# Patient Record
Sex: FEMALE | URBAN - METROPOLITAN AREA
[De-identification: names, ages, dates, MRNs, and addresses within clinical notes are randomized per-mention and may not be internally consistent; named-entity substitution may affect disease eponyms.]

---

## 2023-11-15 ENCOUNTER — LAB OUTSIDE AN ENCOUNTER (OUTPATIENT)
Dept: URBAN - METROPOLITAN AREA CLINIC 94 | Facility: CLINIC | Age: 48
End: 2023-11-15

## 2023-11-15 ENCOUNTER — OFFICE VISIT (OUTPATIENT)
Dept: URBAN - METROPOLITAN AREA CLINIC 94 | Facility: CLINIC | Age: 48
End: 2023-11-15
Payer: COMMERCIAL

## 2023-11-15 ENCOUNTER — DASHBOARD ENCOUNTERS (OUTPATIENT)
Age: 48
End: 2023-11-15

## 2023-11-15 VITALS
OXYGEN SATURATION: 95 % | BODY MASS INDEX: 33.18 KG/M2 | HEIGHT: 69 IN | TEMPERATURE: 97.5 F | SYSTOLIC BLOOD PRESSURE: 120 MMHG | WEIGHT: 224 LBS | DIASTOLIC BLOOD PRESSURE: 77 MMHG | HEART RATE: 87 BPM

## 2023-11-15 DIAGNOSIS — Z12.11 ENCOUNTER FOR SCREENING COLONOSCOPY: ICD-10-CM

## 2023-11-15 PROCEDURE — 99202 OFFICE O/P NEW SF 15 MIN: CPT | Performed by: INTERNAL MEDICINE

## 2023-11-15 PROCEDURE — 99242 OFF/OP CONSLTJ NEW/EST SF 20: CPT | Performed by: INTERNAL MEDICINE

## 2023-11-15 RX ORDER — ESCITALOPRAM OXALATE 20 MG/1
1 TABLET TABLET, FILM COATED ORAL ONCE A DAY
Status: ACTIVE | COMMUNITY

## 2023-11-15 RX ORDER — CHOLECALCIFEROL (VITAMIN D3) 50 MCG
1 TABLET TABLET ORAL ONCE A DAY
Status: ACTIVE | COMMUNITY

## 2023-11-15 RX ORDER — LEVOTHYROXINE SODIUM 125 UG/1
1 TABLET IN THE MORNING ON AN EMPTY STOMACH CAPSULE ORAL ONCE A DAY
Status: ACTIVE | COMMUNITY

## 2023-11-15 RX ORDER — LISINOPRIL 20 MG/1
1 TABLET TABLET ORAL ONCE A DAY
Status: ACTIVE | COMMUNITY

## 2023-11-15 RX ORDER — SODIUM, POTASSIUM,MAG SULFATES 17.5-3.13G
177 ML AS DIRECTED SOLUTION, RECONSTITUTED, ORAL ORAL ONCE
Qty: 177 ML | Refills: 0 | OUTPATIENT
Start: 2023-11-15 | End: 2023-11-16

## 2023-11-15 NOTE — HPI-TODAY'S VISIT:
49 yo F evaluated today for screening colonoscopy. Pt referred to clinic by DR Karlee Blank Copy of records will be faxed to referring MD for review.   Pt denies ever having a colonoscopy. She denies GI sx. Denies abdominal pain, melena or hematochezia. Denies unintentional wt loss or changes in bowel habits. Pt with hx of gastric sleeve 2018. Does have some consipation due to high protein diet   Pt denies family hx of colon cancer.

## 2023-12-04 ENCOUNTER — CLAIMS CREATED FROM THE CLAIM WINDOW (OUTPATIENT)
Dept: URBAN - METROPOLITAN AREA SURGERY CENTER 17 | Facility: SURGERY CENTER | Age: 48
End: 2023-12-04
Payer: COMMERCIAL

## 2023-12-04 DIAGNOSIS — Z12.11 COLON CANCER SCREENING (HIGH RISK): ICD-10-CM

## 2023-12-04 DIAGNOSIS — Z12.11 COLON CANCER SCREENING: ICD-10-CM

## 2023-12-04 PROCEDURE — G0121 COLON CA SCRN NOT HI RSK IND: HCPCS | Performed by: INTERNAL MEDICINE

## 2023-12-04 PROCEDURE — G8907 PT DOC NO EVENTS ON DISCHARG: HCPCS | Performed by: INTERNAL MEDICINE

## 2023-12-04 PROCEDURE — 00812 ANES LWR INTST SCR COLSC: CPT | Performed by: NURSE ANESTHETIST, CERTIFIED REGISTERED
